# Patient Record
Sex: FEMALE | Race: BLACK OR AFRICAN AMERICAN | NOT HISPANIC OR LATINO | URBAN - METROPOLITAN AREA
[De-identification: names, ages, dates, MRNs, and addresses within clinical notes are randomized per-mention and may not be internally consistent; named-entity substitution may affect disease eponyms.]

---

## 2021-06-11 ENCOUNTER — TELEPHONE (OUTPATIENT)
Dept: OBSTETRICS AND GYNECOLOGY | Facility: CLINIC | Age: 51
End: 2021-06-11

## 2023-07-24 DIAGNOSIS — M25.562 LEFT KNEE PAIN, UNSPECIFIED CHRONICITY: Primary | ICD-10-CM

## 2023-07-26 ENCOUNTER — OFFICE VISIT (OUTPATIENT)
Dept: ORTHOPEDICS | Facility: CLINIC | Age: 53
End: 2023-07-26
Payer: COMMERCIAL

## 2023-07-26 DIAGNOSIS — M17.12 PRIMARY OSTEOARTHRITIS OF LEFT KNEE: Primary | ICD-10-CM

## 2023-07-26 DIAGNOSIS — M25.562 LEFT KNEE PAIN, UNSPECIFIED CHRONICITY: ICD-10-CM

## 2023-07-26 PROCEDURE — 99999 PR PBB SHADOW E&M-EST. PATIENT-LVL III: ICD-10-PCS | Mod: PBBFAC,,, | Performed by: ORTHOPAEDIC SURGERY

## 2023-07-26 PROCEDURE — 99203 OFFICE O/P NEW LOW 30 MIN: CPT | Mod: S$GLB,,, | Performed by: ORTHOPAEDIC SURGERY

## 2023-07-26 PROCEDURE — 99203 PR OFFICE/OUTPT VISIT, NEW, LEVL III, 30-44 MIN: ICD-10-PCS | Mod: S$GLB,,, | Performed by: ORTHOPAEDIC SURGERY

## 2023-07-26 PROCEDURE — 99999 PR PBB SHADOW E&M-EST. PATIENT-LVL III: CPT | Mod: PBBFAC,,, | Performed by: ORTHOPAEDIC SURGERY

## 2023-07-26 RX ORDER — LOSARTAN POTASSIUM AND HYDROCHLOROTHIAZIDE 25; 100 MG/1; MG/1
TABLET ORAL DAILY
COMMUNITY

## 2023-07-26 RX ORDER — MELOXICAM 7.5 MG/1
7.5 TABLET ORAL DAILY
COMMUNITY

## 2023-07-26 NOTE — PROGRESS NOTES
Assessment: 52 y.o. female with Left knee osteoarthritis     We discussed the findings on xray and clinical exam as well as the natural history of arthritis.     Plan:   - Discussed further treatment options including RFA and TKA. She would like to try RFA first. Referred to pain  - Return to clinic PRN    All questions were answered in detail. The patient is in full agreement with the treatment plan and will proceed accordingly.    Chief Complaint   Patient presents with    Left Knee - Pain        HPI (07/26/2023): Hayley Murcia is a 52 y.o. female who presents today complaining of left knee pain  Has had pain for several years   Was seen by Dr Barakat about 5 years ago, had about 4-5 years of relief with a CSI  Pain returned about 1 year ago and she has tried a another CSI and a course of viscusupplementation without relief. She saw one of the PAs over there.   Trauma or new activity: no  Pain is constant  Aggravating factors: pain with weight bearing activity  Relieving factors: rest  Associated symptoms:  swelling.    Prior treatment:    Meloxicam (relief for about 1 hour), no relief with CSI or viscosupplementation   Pain does interfere with activities of daily living .    This is the extent of the patient's complaints at this time.      Occupation:  at [employer]. 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    Patient Active Problem List   Diagnosis    Vertigo       No past surgical history on file.    Social History     Tobacco Use    Smoking status: Never   Substance Use Topics    Alcohol use: Yes     Alcohol/week: 2.0 standard drinks      Types: 2 Standard drinks or equivalent per week    Drug use: No       No family history on file.    Physical Exam:   Vitals:    07/26/23 0939   PainSc:   7   PainLoc: Knee       General:  Patient is alert, awake and oriented to time, place and person. Mood and affect are appropriate.  Patient does not appear to be in any distress, denies any constitutional symptoms and appears stated age.   HEENT: Pupils are equal and round, sclera are not injected. External examination of ears and nose reveals no abnormalities. Cranial nerves II-X are grossly intact  Skin: no rashes, abrasions or open wounds on the affected extremity   Resp: No respiratory distress or audible wheezing   CV: 2+  pulses, all extremities warm and well perfused   Left knee(s)  Localizes pain over MJl, LJL   No swelling, effusion, or erythema  Active ROM: 0 - 130  Correctable varus deformity   Tender to palpation over medial joint line  and lateral joint line   good  quadricep muscle tone and bulk; no atrophy compared to contralateral extremity   ltsi s/s/sp/dp/t   + ehl/fhl/ta/gs  2 + DP      Imaging:  3 views left knee:  moderate tricompartmental OA with subchondral sclerosis, joint space narrowing, osteophyte formation and loose bodies.      I personally reviewed and interpreted the patient's imaging obtained today in clinic     This note was created by combination of typed  and M-Modal dictation. Transcription and phonetic errors may be present.  If there are any questions, please contact me.      Current Outpatient Medications:     meclizine (ANTIVERT) 25 mg tablet, Take 1 tablet (25 mg total) by mouth 3 (three) times daily as needed., Disp: 20 tablet, Rfl: 0

## 2023-08-07 ENCOUNTER — OFFICE VISIT (OUTPATIENT)
Dept: PAIN MEDICINE | Facility: CLINIC | Age: 53
End: 2023-08-07
Payer: COMMERCIAL

## 2023-08-07 VITALS
WEIGHT: 293 LBS | BODY MASS INDEX: 49.85 KG/M2 | SYSTOLIC BLOOD PRESSURE: 170 MMHG | RESPIRATION RATE: 20 BRPM | HEART RATE: 83 BPM | OXYGEN SATURATION: 98 % | DIASTOLIC BLOOD PRESSURE: 89 MMHG

## 2023-08-07 DIAGNOSIS — M25.562 CHRONIC PAIN OF LEFT KNEE: Primary | ICD-10-CM

## 2023-08-07 DIAGNOSIS — M17.12 PRIMARY OSTEOARTHRITIS OF LEFT KNEE: ICD-10-CM

## 2023-08-07 DIAGNOSIS — G89.29 CHRONIC PAIN OF LEFT KNEE: Primary | ICD-10-CM

## 2023-08-07 PROCEDURE — 99999 PR PBB SHADOW E&M-EST. PATIENT-LVL III: CPT | Mod: PBBFAC,,, | Performed by: PAIN MEDICINE

## 2023-08-07 PROCEDURE — 99204 PR OFFICE/OUTPT VISIT, NEW, LEVL IV, 45-59 MIN: ICD-10-PCS | Mod: S$GLB,,, | Performed by: PAIN MEDICINE

## 2023-08-07 PROCEDURE — 99204 OFFICE O/P NEW MOD 45 MIN: CPT | Mod: S$GLB,,, | Performed by: PAIN MEDICINE

## 2023-08-07 PROCEDURE — 99999 PR PBB SHADOW E&M-EST. PATIENT-LVL III: ICD-10-PCS | Mod: PBBFAC,,, | Performed by: PAIN MEDICINE

## 2023-08-07 NOTE — PROGRESS NOTES
Subjective:     Patient ID: Hayley Murcia is a 52 y.o. female    Chief Complaint: Knee Pain (Left sided throbbing and stabbing pain)      Referred by: Inge Pradhan MD      HPI:    Initial Encounter (8/7/23):  Hayley Murcia is a 52 y.o. female who presents today with chronic left-sided knee pain secondary to osteoarthritis.  Has been treated by Orthopedic surgery in the past.  Has failed intra-articular injections.  Not interested in considering total knee replacement at this time.  She was referred to me to discuss genicular nerve blocks/RFA.   This pain is described in detail below.    Physical Therapy:  Yes.    Non-pharmacologic Treatment:  Rest helps         TENS?  No    Pain Medications:         Currently taking:  Meloxicam    Has tried in the past:  NSAIDs, Tylenol    Has not tried:  Opioids, Muscle relaxants, TCAs, SNRIs, anticonvulsants, topical creams    Blood thinners:  None    Interventional Therapies:  None    Relevant Surgeries:  None    Affecting sleep?  Yes    Affecting daily activities? yes    Depressive symptoms? No          SI/HI? No    Work status: Employed    Pain Scores:    Best:       5/10  Worst:     10/10  Usually:   8/10  Today:    10/10    Pain Disability Index  Family/Home Responsibilities:: 5  Recreation:: 5  Social Activity:: 6  Occupation:: 6  Sexual Behavior:: 0 (Not active)  Self Care:: 5  Life-Support Activities:: 2  Pain Disability Index (PDI): 29    Review of Systems   Constitutional:  Negative for activity change, appetite change, chills, fatigue, fever and unexpected weight change.   HENT:  Negative for hearing loss.    Eyes:  Negative for visual disturbance.   Respiratory:  Negative for chest tightness and shortness of breath.    Cardiovascular:  Negative for chest pain.   Gastrointestinal:  Negative for abdominal pain, constipation, diarrhea, nausea and vomiting.   Genitourinary:  Negative for difficulty urinating.   Musculoskeletal:  Positive for arthralgias, gait  problem and myalgias. Negative for back pain and neck pain.   Skin:  Negative for rash.   Neurological:  Negative for dizziness, weakness, light-headedness, numbness and headaches.   Psychiatric/Behavioral:  Positive for sleep disturbance. Negative for hallucinations and suicidal ideas. The patient is not nervous/anxious.        History reviewed. No pertinent past medical history.    History reviewed. No pertinent surgical history.    Social History     Socioeconomic History    Marital status:    Tobacco Use    Smoking status: Never   Substance and Sexual Activity    Alcohol use: Yes     Alcohol/week: 2.0 standard drinks of alcohol     Types: 2 Standard drinks or equivalent per week    Drug use: No       Review of patient's allergies indicates:   Allergen Reactions    Morphine        Current Outpatient Medications on File Prior to Visit   Medication Sig Dispense Refill    losartan-hydrochlorothiazide 100-25 mg (HYZAAR) 100-25 mg per tablet Take by mouth once daily.      meclizine (ANTIVERT) 25 mg tablet Take 1 tablet (25 mg total) by mouth 3 (three) times daily as needed. 20 tablet 0    meloxicam (MOBIC) 7.5 MG tablet Take 7.5 mg by mouth once daily.       No current facility-administered medications on file prior to visit.       Objective:      BP (!) 170/89 (BP Location: Right arm, Patient Position: Sitting, BP Method: Medium (Automatic))   Pulse 83   Resp 20   Wt (!) 153.1 kg (337 lb 9.6 oz)   SpO2 98%   BMI 49.85 kg/m²     Exam:  GEN:  Well developed, well nourished.  No acute distress.   HEENT:  No trauma.  Mucous membranes moist.  Nares patent bilaterally.  PSYCH: Normal affect. Thought content appropriate.  CHEST:  Breathing symmetric.  No audible wheezing.  ABD: Soft, non-distended.  SKIN:  Warm, pink, dry.  No rash on exposed areas.    EXT:  No cyanosis, clubbing, or edema.  No color change or changes in nail or hair growth.  NEURO/MUSCULOSKELETAL:  Fully alert, oriented, and appropriate. Speech  normal cisco. No cranial nerve deficits.   Gait:  Antalgic.  No focal motor deficits.       Imaging:    Narrative & Impression    EXAMINATION:  XR KNEE 3 VIEW LEFT     CLINICAL HISTORY:  Pain in left knee     FINDINGS:  Knee three views left: There is DJD and a varus deformity.  No fracture, dislocation, or bone destruction seen.  There are spurs on the patella.        Electronically signed by: Jose Roberto Rhoades MD  Date:                                            07/26/2023  Time:                                           09:51       Assessment:       Encounter Diagnoses   Name Primary?    Primary osteoarthritis of left knee     Chronic pain of left knee Yes         Plan:       Hayley was seen today for knee pain.    Diagnoses and all orders for this visit:    Chronic pain of left knee  -     Case Request Endoscopy: Left Genicular Nerve Blocks Under Fluoroscopy #1    Primary osteoarthritis of left knee  -     Ambulatory referral/consult to Pain Clinic  -     Case Request Endoscopy: Left Genicular Nerve Blocks Under Fluoroscopy #1        Hayley Murcia is a 52 y.o. female with chronic left knee pain secondary to osteoarthritis.  Has failed intra-articular steroid injections.  Not ready to consider total knee replacement surgery at this time.    Pertinent imaging studies reviewed by me. Imaging results were discussed with patient.  Schedule for left genicular nerve blocks under fluoroscopy x2.  If diagnostic can proceed with cooled radiofrequency ablations.    Return to clinic after procedures to discuss results.          This note was created by combination of typed  and M-Modal dictation. Transcription and phonetic errors may be present.  If there are any questions, please contact me.

## 2023-08-07 NOTE — PROGRESS NOTES
Ms. Murcia will be scheduled for left genicular nerve blocks #1 on 8/16/23, checking in at 12:30pm.  Reviewed the pre-procedure instructions listed below with the patient- copy also provided.  Instructed patient to notify clinic if he OR she begins feeling ill, runs a fever, is prescribed antibiotics, and/or has any outpatient procedures (colonoscopy, endoscopy, OBGYN, dental work, etc.), and/or any vaccinations or booster shots within the two weeks leading up to this procedure.  Instructed patient to report to [facility], 2nd Floor Endoscopy Registration Desk.  All questions answered- patient verbalized understanding.     Day of Procedure  Ensure you have obtained an arrival time from the Pain Management Staff  You will be contacted the week before your scheduled date to review these instructions and receive your arrival time.  Please check any voicemails received.  If you arrive past your scheduled procedure time, you may be asked to reschedule your procedure.     Take all of your prescribed medications that you routinely take for blood pressure, heart medications, thyroid, cholesterol, etc.  You will be informed if blood thinners should be held.      This is NOT a fasting procedure, you may eat the day of your procedure.    Wear comfortable clothing (loose fitting pants).  You may wear glasses, dentures, contact lenses, and/or hearing aids.     Notify the nurse during the intake process if you are allergic to any medications, if you are diabetic, or if you are not feeling well      Diabetic Patients  Please check your blood sugar prior to coming in for your procedure.  If the value is greater than 200, contact the clinic [phone] as the procedure may need to be rescheduled.  The procedure may not be performed if your blood sugar is above 200 even after checking into the hospital.      IF you are taking blood thinners, please make sure our staff is aware so that we can obtain clearance and  have you hold the medication accordingly.

## 2023-08-10 ENCOUNTER — TELEPHONE (OUTPATIENT)
Dept: PAIN MEDICINE | Facility: CLINIC | Age: 53
End: 2023-08-10
Payer: COMMERCIAL

## 2023-08-10 NOTE — TELEPHONE ENCOUNTER
Reviewed pre-procedure instructions with Mrs. Granadoberly, as well as provided arrival time of 12:30p for 8/16/23.  All questions answered- verbalized understanding.

## 2023-08-15 ENCOUNTER — TELEPHONE (OUTPATIENT)
Dept: PAIN MEDICINE | Facility: CLINIC | Age: 53
End: 2023-08-15
Payer: COMMERCIAL

## 2023-08-15 NOTE — TELEPHONE ENCOUNTER
----- Message from Delfina Wallace sent at 8/15/2023  9:38 AM CDT -----  Type: Patient Call Back    Who called:Self    What is the request in detail:In regards to cancelling surgery on tomorrow, pt will call to reschedule     Can the clinic reply by [facility]?No    Would the patient rather a call back or a response via [facility]? Call    Best call back number:.[phone] (home)     Additional Information:

## 2023-08-15 NOTE — TELEPHONE ENCOUNTER
LVM asking pt to contact clinic to confirm that she wishes to cancel the procedure scheduled tomorrow, rather than R/S- phone number included.